# Patient Record
Sex: MALE | Race: WHITE | NOT HISPANIC OR LATINO | URBAN - METROPOLITAN AREA
[De-identification: names, ages, dates, MRNs, and addresses within clinical notes are randomized per-mention and may not be internally consistent; named-entity substitution may affect disease eponyms.]

---

## 2020-03-05 ENCOUNTER — OFFICE (OUTPATIENT)
Dept: URBAN - METROPOLITAN AREA CLINIC 75 | Facility: CLINIC | Age: 50
End: 2020-03-05
Payer: COMMERCIAL

## 2020-03-05 VITALS
HEIGHT: 69 IN | HEART RATE: 80 BPM | SYSTOLIC BLOOD PRESSURE: 122 MMHG | DIASTOLIC BLOOD PRESSURE: 88 MMHG | WEIGHT: 204 LBS

## 2020-03-05 DIAGNOSIS — R11.2 NAUSEA WITH VOMITING, UNSPECIFIED: ICD-10-CM

## 2020-03-05 DIAGNOSIS — B18.1 CHRONIC VIRAL HEPATITIS B WITHOUT DELTA-AGENT: ICD-10-CM

## 2020-03-05 DIAGNOSIS — R19.7 DIARRHEA, UNSPECIFIED: ICD-10-CM

## 2020-03-05 PROCEDURE — 99244 OFF/OP CNSLTJ NEW/EST MOD 40: CPT | Performed by: INTERNAL MEDICINE

## 2020-03-05 RX ORDER — AMITRIPTYLINE HYDROCHLORIDE 10 MG/1
TABLET, FILM COATED ORAL
Qty: 60 | Refills: 3 | Status: ACTIVE
Start: 2020-03-05

## 2020-03-05 NOTE — SERVICENOTES
records reviewed.  CBC one-to-one remainder of CBC unremarkable.  B12 folate within normal limits.  Lipase 62.  H. pylori breath test negative.  TSH negative.  Ultrasound from 6/2018 negative.

## 2020-03-05 NOTE — SERVICEHPINOTES
Thank you very much for referring Mister Braswell for evaluation. She is a pleasant 49-year-old gentleman with history of HIV and hepatitis B for which he is followed with infectious disease at [facility].  He is here today because for the past several months he has been having intermittent episodes of nausea, vomiting and diarrhea. He'll have symptoms for 2 or 3 days then be fine for weeks at a time and the symptoms relapse. Symptoms occur on average couple at times a month. Is no obvious trigger. When it happens he'll have nausea or vomiting with without food. I'll have dry heaves liquids will make him sick. There is no obvious food trigger. There is no dysphagia, odynophagia melena or hematemesis. He'll lose a little weight 26 but his weight recovers and he's been gaining weight overall. Laboratories are unremarkable, he had an ultrasound in 2018 was negative any schedule for another ultrasound this month. He had an upper endoscopy at [facility] in December. He says he just had an irregular Z line and what sounds like gastritis. I do not have a copy of the report of biopsies. He is on what sounds like a PPI but he is not sure what it is. He thought it helped initially. He does smoke less than a pack a day, he is a social drinker but he says he is drinking very little because of his symptoms. He has no history of chronic cannabis use.He also gets diarrhea when he has nausea and vomiting. Between nausea and vomiting episodes he's fine. He also had a colonoscopy done to time of his EGD. There is no family history of polyps colitis or colon cancer. He says that no one said anything about having colitis or Crohn's after his colonoscopy. Stool studies is never been done. There's been no blood in the bowels. He is on no new medicines other than a PPI. He said no abdominal surgeries. There is no fevers or chills. No travel or well water use. He is in no distress. He does not look acutely ill.

## 2020-04-23 ENCOUNTER — OFFICE (OUTPATIENT)
Dept: URBAN - METROPOLITAN AREA CLINIC 75 | Facility: CLINIC | Age: 50
End: 2020-04-23
Payer: COMMERCIAL

## 2020-04-23 VITALS — HEIGHT: 69 IN | WEIGHT: 202 LBS

## 2020-04-23 DIAGNOSIS — R11.2 NAUSEA WITH VOMITING, UNSPECIFIED: ICD-10-CM

## 2020-04-23 DIAGNOSIS — R10.32 LEFT LOWER QUADRANT PAIN: ICD-10-CM

## 2020-04-23 DIAGNOSIS — A04.72 ENTEROCOLITIS DUE TO CLOSTRIDIUM DIFFICILE, NOT SPECIFIED AS: ICD-10-CM

## 2020-04-23 DIAGNOSIS — R19.7 DIARRHEA, UNSPECIFIED: ICD-10-CM

## 2020-04-23 DIAGNOSIS — B18.1 CHRONIC VIRAL HEPATITIS B WITHOUT DELTA-AGENT: ICD-10-CM

## 2020-04-23 DIAGNOSIS — R10.31 RIGHT LOWER QUADRANT PAIN: ICD-10-CM

## 2020-04-23 PROCEDURE — 99213 OFFICE O/P EST LOW 20 MIN: CPT | Mod: 95 | Performed by: INTERNAL MEDICINE

## 2020-04-23 RX ORDER — FIDAXOMICIN 200 MG/1
TABLET, FILM COATED ORAL
Qty: 20 | Refills: 0 | Status: ACTIVE
Start: 2020-04-23

## 2020-04-23 NOTE — SERVICEHPINOTES
Due to the Cor[place] virus pandemic the patient was seen via telemedicine through [facility].Mister Braswell has a history of diarrhea and abdominal pain, he has been treated for C. difficile, I recently saw him in the office and he continued to have episodic diarrhea as well as nausea and vomiting. His upper GI symptoms have improved. His diarrhea has persisted. I did stool studies on him and his lactoferrin was elevated and stool C. difficile toxin remains positive.He's been treated with vancomycin. We talked about treatment options which would include tapering vancomycin regimen versus a trial of Dificid. At this point I would like to try him on Dificid. He is on a probiotic as well. In terms of symptoms he said last week he had a three-day period where he had quite a bit of diarrhea with frequent watery stools and nocturnal symptoms as well as intermittent crampy abdominal pain. For the past several days he's had what he describes as loose stool, not watery stool. The frequency has slowed. He is having 3-5 stools a day in the cramping has improved. The pain is not severe. Is not debilitating. There is no blood in the bowels. There is no fevers chills or weight loss.He has no upper GI complaints. As above his nausea and vomiting have improved. His appetite is good his weight is stable. There is no reflux, dysphagia, odynophagia nausea or vomiting. There is no melena or hematemesis. He does take omeprazole 40 mg daily. He is a smoker, he is a social drinker. He is in no distress. He does not look acutely ill.